# Patient Record
Sex: MALE | Race: WHITE | NOT HISPANIC OR LATINO | Employment: OTHER | URBAN - NONMETROPOLITAN AREA
[De-identification: names, ages, dates, MRNs, and addresses within clinical notes are randomized per-mention and may not be internally consistent; named-entity substitution may affect disease eponyms.]

---

## 2020-01-26 ENCOUNTER — HOSPITAL ENCOUNTER (EMERGENCY)
Facility: HOSPITAL | Age: 65
Discharge: HOME | End: 2020-01-26
Attending: NURSE PRACTITIONER | Admitting: NURSE PRACTITIONER
Payer: MEDICARE

## 2020-01-26 ENCOUNTER — APPOINTMENT (OUTPATIENT)
Dept: GENERAL RADIOLOGY | Facility: HOSPITAL | Age: 65
End: 2020-01-26
Attending: NURSE PRACTITIONER
Payer: MEDICARE

## 2020-01-26 VITALS
DIASTOLIC BLOOD PRESSURE: 88 MMHG | RESPIRATION RATE: 18 BRPM | SYSTOLIC BLOOD PRESSURE: 123 MMHG | TEMPERATURE: 97.5 F | OXYGEN SATURATION: 99 %

## 2020-01-26 DIAGNOSIS — M25.461 EFFUSION OF RIGHT KNEE JOINT: ICD-10-CM

## 2020-01-26 PROCEDURE — 73562 X-RAY EXAM OF KNEE 3: CPT | Mod: TC,RT

## 2020-01-26 PROCEDURE — 99202 OFFICE O/P NEW SF 15 MIN: CPT | Mod: Z6 | Performed by: NURSE PRACTITIONER

## 2020-01-26 PROCEDURE — G0463 HOSPITAL OUTPT CLINIC VISIT: HCPCS

## 2020-01-26 RX ORDER — CIPROFLOXACIN 250 MG/1
250 TABLET, FILM COATED ORAL 2 TIMES DAILY
COMMUNITY
Start: 2020-01-24 | End: 2020-02-02

## 2020-01-26 ASSESSMENT — ENCOUNTER SYMPTOMS
VOMITING: 0
CHILLS: 0
ACTIVITY CHANGE: 1
NAUSEA: 0
FEVER: 0
ARTHRALGIAS: 1

## 2020-01-26 NOTE — DISCHARGE INSTRUCTIONS
Ace wrap knee until after you have been seen per orthopedics.  Toe touch weight bearing and use crutches.  Keep affected extremity elevated as much as possible for next 24 - 48 hours. Ice to affected area 20 minutes every hour as needed for comfort. After 48 hours you can apply heat. Ibuprofen 600 to 800 mg (3 - 4 tabs of over the counter med) every six to eight hours as needed;not to exceed maximum amount of 3200 mg in 24 hours.Tylenol 650 to 1000 mg every four to six hours as needed (not to exceed more than 4000 mg in a 24 hour period). May use interchangeably. Suggest medicating around the clock for the next 24-48 hours. Use ace wrap splint  until you have completed your follow-up appointment. Slowly start to wiggle your toes and move foot as often as possible but not beyond the point of pain. Follow up with primary provider as needed.  Orthopedic referral placed

## 2020-01-26 NOTE — ED PROVIDER NOTES
"  History     Chief Complaint   Patient presents with     Knee Pain     fell to his right knee yesterday around 4pm. Able to walk on his leg last night. Today the right knee is swelling and painful. Unable to bear weight on right foot. Using crutches.      HPI  Kennedy Styles is a 64 year old male who is moving out of his house and fell on his right knee yesterday. He states he has been very active throughout his lifetime and has never had much trouble with his knees. He did have \"water on his knee,\" in high school and has had arthroscopes completed on both knees. He denies hip and ankle pain.    Musculoskeletal problem/pain      Duration: fell yesterday around 1600 tripped and fell onto right knee    Description  Location: right knee    Intensity:  8/10    Accompanying signs and symptoms: none    History  Previous similar problem: YES- has played hard, had arthoscope, has been very active  Previous evaluation:  Previous arthroscope    Precipitating or alleviating factors:  Trauma or overuse: YES  Aggravating factors include: walking    Therapies tried and outcome: nothing       Allergies:  No Known Allergies    Problem List:    There are no active problems to display for this patient.       Past Medical History:    History reviewed. No pertinent past medical history.    Past Surgical History:    History reviewed. No pertinent surgical history.    Family History:    No family history on file.    Social History:  Marital Status:   [2]  Social History     Tobacco Use     Smoking status: None   Substance Use Topics     Alcohol use: None     Drug use: None        Medications:    ciprofloxacin (CIPRO) 250 MG tablet          Review of Systems   Constitutional: Positive for activity change. Negative for chills and fever.   Gastrointestinal: Negative for nausea and vomiting.   Musculoskeletal: Positive for arthralgias (right knee pain after falling on knee).   Skin: Negative.        Physical Exam   BP: " 123/88  Heart Rate: 75  Temp: 97.5  F (36.4  C)  Resp: 18  SpO2: 99 %      Physical Exam  Vitals signs and nursing note reviewed.   Constitutional:       General: He is in acute distress.      Appearance: Normal appearance.   Cardiovascular:      Rate and Rhythm: Normal rate.   Pulmonary:      Effort: Pulmonary effort is normal.   Musculoskeletal:         General: Swelling, tenderness and signs of injury present.      Right knee: He exhibits decreased range of motion, swelling and effusion. Tenderness found. Lateral joint line tenderness noted. No medial joint line tenderness noted.        Legs:    Skin:     General: Skin is warm and dry.   Neurological:      Mental Status: He is alert and oriented to person, place, and time.   Psychiatric:         Behavior: Behavior normal.         ED Course        Procedures            Results for orders placed or performed during the hospital encounter of 01/26/20 (from the past 24 hour(s))   XR Knee Right 3 Views    Narrative    PROCEDURE:  XR KNEE RT 3 VW    HISTORY: fell and landed on knee yesterday.    COMPARISON:  MR right knee 10/30/2014    TECHNIQUE:  4 views right knee.    FINDINGS:  There is moderate to severe tricompartmental  osteoarthritis. There is linear lucency through the outer aspect of  the medial tibial plateau. Diminished T1 signal is seen in this area  on the prior MR. A suprapatellar effusion is present. No prior  radiographs are available for comparison.      Impression    IMPRESSION: Linear lucency involving the medial tibial plateau does  not clearly traverse the distal cortex and may reflect some chronic  degeneration or chronic fracture related change. Consider follow-up.     SARINA MAY MD       Medications - No data to display    Assessments & Plan (with Medical Decision Making)     I have reviewed the nursing notes.    I have reviewed the findings, diagnosis, plan and need for follow up with the patient.  (M25.461) Effusion of right knee  joint    Comment: 64 year old male presented with right knee pain after falling onto his right knee. Initially, he could walk on his knee but today it has become too painful to walk on. X-ray reviewed and per radiology he has a suprapatellar joint effusion and possible chronic medial tibial plateau fracture. No definite acute fracture noted. This was not present on his MRI 10/2014.     Plan: Ace wrap and knee immobilizer placed and ORTHOPEDICS ADULT REFERRAL placed.  Ace wrap knee until after you have been seen per orthopedics.  No weight bearing and use crutches. (instructed verbally)  Keep affected extremity elevated as much as possible for next 24 - 48 hours. Ice to affected area 20 minutes every hour as needed for comfort. After 48 hours you can apply heat. Ibuprofen 600 to 800 mg (3 - 4 tabs of over the counter med) every six to eight hours as needed;not to exceed maximum amount of 3200 mg in 24 hours.Tylenol 650 to 1000 mg every four to six hours as needed (not to exceed more than 4000 mg in a 24 hour period). May use interchangeably. Suggest medicating around the clock for the next 24-48 hours. Use ace wrap and knee immobilizer  until you have completed your follow-up appointment. Slowly start to wiggle your toes and move foot as often as possible but not beyond the point of pain. Follow up with primary provider as needed.  These discharge instructions and medications were reviewed with him and understanding verbalized.              Discharge Medication List as of 1/26/2020 11:51 AM          Final diagnoses:   Effusion of right knee joint       1/26/2020   [facility]       Rosa East, CNP  01/26/20 7781

## 2020-01-26 NOTE — ED AVS SNAPSHOT
[facility]  [address] [address]  Phone:  [phone]                                    Kennedy Styles   MRN: [id number]    Department:  [facility]   Date of Visit:  1/26/2020           After Visit Summary Signature Page    I have received my discharge instructions, and my questions have been answered. I have discussed any challenges I see with this plan with the nurse or doctor.    ..........................................................................................................................................  Patient/Patient Representative Signature      ..........................................................................................................................................  Patient Representative Print Name and Relationship to Patient    ..................................................               ................................................  Date                                   Time    ..........................................................................................................................................  Reviewed by Signature/Title    ...................................................              ..............................................  Date                                               Time          22EPIC Rev 08/18

## 2020-01-26 NOTE — ED TRIAGE NOTES
States fell yesterday and landed on R knee. Has been able to walk after injury, but today is having increased pain and swelling, ambulating with crutches, 4 point

## 2022-07-07 ENCOUNTER — HOSPITAL ENCOUNTER (EMERGENCY)
Facility: HOSPITAL | Age: 67
Discharge: HOME | End: 2022-07-07
Attending: NURSE PRACTITIONER | Admitting: NURSE PRACTITIONER
Payer: MEDICARE

## 2022-07-07 VITALS
SYSTOLIC BLOOD PRESSURE: 116 MMHG | RESPIRATION RATE: 18 BRPM | OXYGEN SATURATION: 97 % | HEART RATE: 84 BPM | DIASTOLIC BLOOD PRESSURE: 81 MMHG | TEMPERATURE: 98.7 F

## 2022-07-07 DIAGNOSIS — M25.562 LEFT KNEE PAIN: Primary | ICD-10-CM

## 2022-07-07 DIAGNOSIS — M25.562 LEFT KNEE PAIN: ICD-10-CM

## 2022-07-07 DIAGNOSIS — M25.562 LEFT KNEE PAIN, UNSPECIFIED CHRONICITY: ICD-10-CM

## 2022-07-07 LAB
ANION GAP SERPL CALCULATED.3IONS-SCNC: 8 MMOL/L (ref 3–14)
BASOPHILS # BLD AUTO: 0 10E3/UL (ref 0–0.2)
BASOPHILS NFR BLD AUTO: 1 %
BUN SERPL-MCNC: 24 MG/DL (ref 7–30)
CALCIUM SERPL-MCNC: 9.9 MG/DL (ref 8.5–10.1)
CHLORIDE BLD-SCNC: 104 MMOL/L (ref 94–109)
CO2 SERPL-SCNC: 26 MMOL/L (ref 20–32)
CREAT SERPL-MCNC: 0.97 MG/DL (ref 0.66–1.25)
EOSINOPHIL # BLD AUTO: 0.1 10E3/UL (ref 0–0.7)
EOSINOPHIL NFR BLD AUTO: 3 %
ERYTHROCYTE [DISTWIDTH] IN BLOOD BY AUTOMATED COUNT: 12.4 % (ref 10–15)
GFR SERPL CREATININE-BSD FRML MDRD: 85 ML/MIN/1.73M2
GLUCOSE BLD-MCNC: 110 MG/DL (ref 70–99)
HCT VFR BLD AUTO: 41.1 % (ref 40–53)
HGB BLD-MCNC: 13.6 G/DL (ref 13.3–17.7)
IMM GRANULOCYTES # BLD: 0 10E3/UL
IMM GRANULOCYTES NFR BLD: 0 %
LYMPHOCYTES # BLD AUTO: 1 10E3/UL (ref 0.8–5.3)
LYMPHOCYTES NFR BLD AUTO: 18 %
MCH RBC QN AUTO: 29.1 PG (ref 26.5–33)
MCHC RBC AUTO-ENTMCNC: 33.1 G/DL (ref 31.5–36.5)
MCV RBC AUTO: 88 FL (ref 78–100)
MONOCYTES # BLD AUTO: 1.1 10E3/UL (ref 0–1.3)
MONOCYTES NFR BLD AUTO: 21 %
NEUTROPHILS # BLD AUTO: 3.1 10E3/UL (ref 1.6–8.3)
NEUTROPHILS NFR BLD AUTO: 57 %
NRBC # BLD AUTO: 0 10E3/UL
NRBC BLD AUTO-RTO: 0 /100
PLAT MORPH BLD: NORMAL
PLATELET # BLD AUTO: 161 10E3/UL (ref 150–450)
POTASSIUM BLD-SCNC: 4.3 MMOL/L (ref 3.4–5.3)
RBC # BLD AUTO: 4.67 10E6/UL (ref 4.4–5.9)
RBC MORPH BLD: NORMAL
SODIUM SERPL-SCNC: 138 MMOL/L (ref 133–144)
WBC # BLD AUTO: 5.3 10E3/UL (ref 4–11)

## 2022-07-07 PROCEDURE — 99213 OFFICE O/P EST LOW 20 MIN: CPT | Performed by: NURSE PRACTITIONER

## 2022-07-07 PROCEDURE — 82310 ASSAY OF CALCIUM: CPT | Performed by: NURSE PRACTITIONER

## 2022-07-07 PROCEDURE — 85004 AUTOMATED DIFF WBC COUNT: CPT | Performed by: NURSE PRACTITIONER

## 2022-07-07 PROCEDURE — G0463 HOSPITAL OUTPT CLINIC VISIT: HCPCS

## 2022-07-07 PROCEDURE — 86431 RHEUMATOID FACTOR QUANT: CPT | Performed by: NURSE PRACTITIONER

## 2022-07-07 PROCEDURE — 36415 COLL VENOUS BLD VENIPUNCTURE: CPT | Performed by: NURSE PRACTITIONER

## 2022-07-07 PROCEDURE — 86618 LYME DISEASE ANTIBODY: CPT | Performed by: NURSE PRACTITIONER

## 2022-07-07 PROCEDURE — 86038 ANTINUCLEAR ANTIBODIES: CPT | Performed by: NURSE PRACTITIONER

## 2022-07-07 RX ORDER — DOXYCYCLINE 100 MG/1
100 TABLET ORAL 2 TIMES DAILY
Qty: 28 TABLET | Refills: 0 | Status: SHIPPED | OUTPATIENT
Start: 2022-07-07 | End: 2022-07-07

## 2022-07-07 RX ORDER — DOXYCYCLINE 100 MG/1
100 TABLET ORAL 2 TIMES DAILY
Qty: 28 TABLET | Refills: 0 | Status: SHIPPED | OUTPATIENT
Start: 2022-07-07

## 2022-07-07 RX ORDER — MULTIVIT WITH MINERALS/LUTEIN
1 TABLET ORAL DAILY
COMMUNITY

## 2022-07-07 RX ORDER — POTASSIUM CITRATE 10 MEQ/1
10 TABLET, EXTENDED RELEASE ORAL 2 TIMES DAILY
COMMUNITY
Start: 2022-06-15

## 2022-07-07 ASSESSMENT — ENCOUNTER SYMPTOMS
ENDOCRINE NEGATIVE: 1
HEMATOLOGIC/LYMPHATIC NEGATIVE: 1
CARDIOVASCULAR NEGATIVE: 1
MUSCULOSKELETAL NEGATIVE: 1
RESPIRATORY NEGATIVE: 1
PSYCHIATRIC NEGATIVE: 1
CONSTITUTIONAL NEGATIVE: 1
GASTROINTESTINAL NEGATIVE: 1
NEUROLOGICAL NEGATIVE: 1
ALLERGIC/IMMUNOLOGIC NEGATIVE: 1
EYES NEGATIVE: 1

## 2022-07-07 NOTE — ED TRIAGE NOTES
Pt reports that he's had a fever over the last couple of days.  Pt states that today he woke up with knee pain.  Pt denies injury.  Pt states he's been woking outside a lot and has had ticks present.

## 2022-07-07 NOTE — ED TRIAGE NOTES
Patient presents to urgent care for night sweats and bilateral knee pain. Patient denies any injury. Patient states he has had ticks on him mostly medium size but he did have a deer tick on him. He is wondering if he could have Lyme's. Would like to be checked for it. He is a very active but the last few days he is unable to function normally.

## 2022-07-07 NOTE — ED PROVIDER NOTES
"  History     Chief Complaint   Patient presents with     Knee Pain     HPI   History presenting illness given by patient.    Kennedy Styles is a 67 year old male who presents for evaluation of bilateral knee pain, night sweats, and generalized not feeling well.  His symptoms started 3 days ago and have progressively worsened.  He is waking up in the middle of the night sweating and having to change his bed out.  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  He notes that he has been pulling ticks off his body some attached some not attached.  He does recall 3 weeks ago pulling off a deer tick from his buttocks and has not developed any bull's-eye rash.  He took aspirin the first night of night sweats.  He considers 98.7 temperature today as a fever as he usually \"runs lower than that.\"  He denies any upper respiratory infection symptoms, cough, chest pain, or shortness of breath.    Allergies:  No Known Allergies    Problem List:    There are no problems to display for this patient.       Past Medical History:    No past medical history on file.    Past Surgical History:    No past surgical history on file.    Family History:    No family history on file.    Social History:  Marital Status:   [2]        Medications:    doxycycline monohydrate (ADOXA) 100 MG tablet  multivitamin (CENTRUM SILVER) tablet  potassium citrate (UROCIT-K) 10 MEQ (1080 MG) CR tablet          Review of Systems   Constitutional: Negative.    HENT: Negative.    Eyes: Negative.    Respiratory: Negative.    Cardiovascular: Negative.    Gastrointestinal: Negative.    Endocrine: Negative.    Genitourinary: Negative.    Musculoskeletal: Negative.    Skin: Negative.    Allergic/Immunologic: Negative.    Neurological: Negative.    Hematological: Negative.  "   Psychiatric/Behavioral: Negative.        Physical Exam   BP: 116/81  Pulse: 84  Temp: 98.7  F (37.1  C)  Resp: 18  SpO2: 97 %      Physical Exam  Vitals and nursing note reviewed.   Constitutional:       Appearance: Normal appearance. He is normal weight.   HENT:      Head: Normocephalic.      Nose: Nose normal.      Mouth/Throat:      Mouth: Mucous membranes are moist.      Pharynx: Oropharynx is clear.   Eyes:      Conjunctiva/sclera: Conjunctivae normal.      Pupils: Pupils are equal, round, and reactive to light.   Cardiovascular:      Rate and Rhythm: Normal rate and regular rhythm.      Pulses: Normal pulses.      Heart sounds: Normal heart sounds.   Pulmonary:      Effort: Pulmonary effort is normal.      Breath sounds: Normal breath sounds.   Abdominal:      General: Abdomen is flat. Bowel sounds are normal.      Palpations: Abdomen is soft.   Musculoskeletal:         General: Tenderness present. No swelling. Normal range of motion.      Cervical back: Normal range of motion.   Skin:     General: Skin is warm and dry.   Neurological:      General: No focal deficit present.      Mental Status: He is alert and oriented to person, place, and time.   Psychiatric:         Mood and Affect: Mood normal.         Behavior: Behavior normal.         Thought Content: Thought content normal.         Judgment: Judgment normal.         ED Course          Assessments & Plan (with Medical Decision Making)   67-year-old male presents with bilateral knee pain, night sweats, and generalized not feeling well.      Differential diagnosis includes and is not limited to  Rheumatoid arthritis, Lyme's disease, fibromyalgia, osteoarthritis, psoriatic arthritis, rheumatic fever, systemic lupus erythematous, and viral arthritis.    Lab results: CBC is completely normal.  Pending Lyme's, FAM, and rheumatoid factor.  Normal lab results    Discharge plan discussed with patient as he has symptoms and recently had a tick bite that we would  treat with doxycycline 100 mg twice a day for 14 days.  Patient will follow-up with his primary care provider next week for reevaluation of symptoms.  Patient instructed if his symptoms worsen or he develops any new concerning symptoms to return to urgent care or ER.    I have reviewed the nursing notes.    I have reviewed the findings, diagnosis, plan and need for follow up with the patient.    Discharge Medication List as of 7/7/2022  1:11 PM      START taking these medications    Details   doxycycline monohydrate (ADOXA) 100 MG tablet Take 1 tablet (100 mg) by mouth 2 times daily for 14 days, Disp-28 tablet, R-0, E-Prescribe             Final diagnoses:   Left knee pain       7/7/2022   [facility]     Camelia Allen APRN CNP  07/13/22 [id number]

## 2022-07-09 LAB — B BURGDOR IGG+IGM SER QL: 0.72

## 2022-07-11 LAB
ANA SER QL IF: NEGATIVE
RHEUMATOID FACT SER NEPH-ACNC: 10 IU/ML
